# Patient Record
Sex: FEMALE | Race: WHITE | Employment: OTHER | URBAN - METROPOLITAN AREA
[De-identification: names, ages, dates, MRNs, and addresses within clinical notes are randomized per-mention and may not be internally consistent; named-entity substitution may affect disease eponyms.]

---

## 2021-08-03 RX ORDER — PANTOPRAZOLE SODIUM 40 MG/1
40 TABLET, DELAYED RELEASE ORAL DAILY
COMMUNITY

## 2021-08-03 RX ORDER — BIMATOPROST 0.3 MG/ML
1 SOLUTION/ DROPS OPHTHALMIC NIGHTLY
COMMUNITY

## 2021-08-03 RX ORDER — LEVALBUTEROL INHALATION SOLUTION 0.63 MG/3ML
1 SOLUTION RESPIRATORY (INHALATION) 2 TIMES DAILY
COMMUNITY

## 2021-08-03 RX ORDER — POTASSIUM CHLORIDE 20 MEQ/1
20 TABLET, EXTENDED RELEASE ORAL 2 TIMES DAILY
COMMUNITY

## 2021-08-03 RX ORDER — MIDODRINE HYDROCHLORIDE 5 MG/1
5 TABLET ORAL
COMMUNITY

## 2021-08-03 RX ORDER — MEGESTROL ACETATE 40 MG/1
40 TABLET ORAL DAILY
COMMUNITY

## 2021-08-03 RX ORDER — ASPIRIN 81 MG/1
81 TABLET, CHEWABLE ORAL DAILY
COMMUNITY

## 2021-08-03 RX ORDER — FUROSEMIDE 20 MG/1
20 TABLET ORAL DAILY
COMMUNITY

## 2021-08-03 RX ORDER — BRIMONIDINE TARTRATE, TIMOLOL MALEATE 2; 5 MG/ML; MG/ML
1 SOLUTION/ DROPS OPHTHALMIC NIGHTLY
COMMUNITY

## 2021-08-05 ENCOUNTER — OFFICE VISIT (OUTPATIENT)
Dept: GERIATRIC MEDICINE | Age: 86
End: 2021-08-05
Payer: MEDICARE

## 2021-08-05 DIAGNOSIS — M15.9 OSTEOARTHRITIS OF MULTIPLE JOINTS, UNSPECIFIED OSTEOARTHRITIS TYPE: ICD-10-CM

## 2021-08-05 DIAGNOSIS — R53.1 WEAKNESS: Primary | ICD-10-CM

## 2021-08-05 DIAGNOSIS — I48.20 CHRONIC ATRIAL FIBRILLATION (HCC): ICD-10-CM

## 2021-08-05 DIAGNOSIS — R10.9 ABDOMINAL PAIN, UNSPECIFIED ABDOMINAL LOCATION: ICD-10-CM

## 2021-08-05 PROCEDURE — 99304 1ST NF CARE SF/LOW MDM 25: CPT | Performed by: INTERNAL MEDICINE

## 2021-08-06 ENCOUNTER — OFFICE VISIT (OUTPATIENT)
Dept: GERIATRIC MEDICINE | Age: 86
End: 2021-08-06
Payer: MEDICARE

## 2021-08-06 VITALS
RESPIRATION RATE: 18 BRPM | SYSTOLIC BLOOD PRESSURE: 131 MMHG | TEMPERATURE: 97.9 F | OXYGEN SATURATION: 96 % | DIASTOLIC BLOOD PRESSURE: 84 MMHG | HEART RATE: 87 BPM

## 2021-08-06 DIAGNOSIS — R26.2 UNABLE TO WALK: ICD-10-CM

## 2021-08-06 DIAGNOSIS — Z93.3 STATUS POST COLOSTOMY (HCC): ICD-10-CM

## 2021-08-06 DIAGNOSIS — I10 ESSENTIAL HYPERTENSION: ICD-10-CM

## 2021-08-06 DIAGNOSIS — J44.9 CHRONIC OBSTRUCTIVE PULMONARY DISEASE, UNSPECIFIED COPD TYPE (HCC): ICD-10-CM

## 2021-08-06 DIAGNOSIS — K56.609 SBO (SMALL BOWEL OBSTRUCTION) (HCC): Primary | ICD-10-CM

## 2021-08-06 DIAGNOSIS — M19.91 PRIMARY OSTEOARTHRITIS, UNSPECIFIED SITE: ICD-10-CM

## 2021-08-06 DIAGNOSIS — M62.81 MUSCLE WEAKNESS (GENERALIZED): ICD-10-CM

## 2021-08-06 LAB
ANION GAP SERPL CALCULATED.3IONS-SCNC: 8 MEQ/L (ref 9–15)
BUN BLDV-MCNC: 10 MG/DL (ref 8–23)
CALCIUM SERPL-MCNC: 8.7 MG/DL (ref 8.5–9.9)
CHLORIDE BLD-SCNC: 105 MEQ/L (ref 95–107)
CO2: 24 MEQ/L (ref 20–31)
CREAT SERPL-MCNC: 0.4 MG/DL (ref 0.5–0.9)
GFR AFRICAN AMERICAN: >60
GFR NON-AFRICAN AMERICAN: >60
GLUCOSE BLD-MCNC: 91 MG/DL (ref 70–99)
HBA1C MFR BLD: 5.2 % (ref 4.8–5.9)
HCT VFR BLD CALC: 26.6 % (ref 37–47)
HEMOGLOBIN: 8.7 G/DL (ref 12–16)
MCH RBC QN AUTO: 30.1 PG (ref 27–31.3)
MCHC RBC AUTO-ENTMCNC: 32.7 % (ref 33–37)
MCV RBC AUTO: 92.1 FL (ref 82–100)
PDW BLD-RTO: 15.4 % (ref 11.5–14.5)
PLATELET # BLD: 330 K/UL (ref 130–400)
POTASSIUM SERPL-SCNC: 3.8 MEQ/L (ref 3.4–4.9)
RBC # BLD: 2.89 M/UL (ref 4.2–5.4)
SODIUM BLD-SCNC: 137 MEQ/L (ref 135–144)
WBC # BLD: 7.1 K/UL (ref 4.8–10.8)

## 2021-08-06 PROCEDURE — 99309 SBSQ NF CARE MODERATE MDM 30: CPT | Performed by: NURSE PRACTITIONER

## 2021-08-06 NOTE — PROGRESS NOTES
Name: Gigi Sees: [facility]  Tanya Parr  Date: 8/6/2021     Subjective:     Chief Complaint   Patient presents with    Follow-up       HPI  Kan Suazo is a 80 y.o. female being seen today for evaluation of acute rehab progress. She was admitted to [facility] from [address] [place] [address] after recovering from a small bowel resection with colostomy and extensive lysis of adhesions. She had postop complications requiring intubation and chest tubes for recurrent pneumothorax. She had new onset atrial fibrillation with a rapid ventricular response which has since resolved. Her past medical history includes but not limited to hypertension hyperlipidemia osteoarthritis vertigo borderline diabetes anxiety COPD and GERD. She is participating in PT and OT secondary to debility and deconditioning. She is sitting on the edge of the bed, therapist in her room, in no acute distress, elderly, frail. She is very hard of hearing, you have to use a writing board to communicate. She is alert and oriented to person and place. Resident's prior level of function was nonambulatory, wheelchair-bound. Therapy states she was min assist to sit on the side of the bed, max of 2 to stand, no ambulation attempted. Resident complains of weakness and fatigue, power grade of upper and lower extremities are 3 out of 5 on a scale of 5. She does have a moist nonproductive cough, lung sounds scattered rhonchi, no wheeze no rales O2 on at 2 L per nasal cannula continuously. Indwelling catheter is draining cloudy yellow urine, she complains of burning at the catheter site, will obtain UA CNS. She is currently on Lovenox for left upper extremity DVT which is resolved. She is complaining of generalized pain all over. She has orders for Tylenol but does not ask for it. Nursing staff report labs today, hemoglobin 8.7 hematocrit 28.6, BMP within acceptable range. Vital signs stable no fever. Denies complaints of chest pain chest palpitations irregular heartbeat lightheadedness dizziness nausea vomiting diarrhea constipation. PMH: Hypertension hyperlipidemia borderline diabetes anxiety osteoarthritis GERD history of TIA history of hyperactive thyroid history of vertigo atrial fibrillation COPD    PSH: History of cholecystectomy, hysterectomy, appendectomy, left shoulder surgery, bilateral knee replacement, cataract surgery bilaterally    SH: Former smoker    Allergies:  Reviewed in nursing facility records  Medications:  Reviewed and reconciled in nursing facility records    Review of Systems Pertinent positives as noted in HPI. Objective:   /84   Pulse 87   Temp 97.9 °F (36.6 °C)   Resp 18   SpO2 96%     Physical Exam Constitutional: Sitting on edge of the bed, elderly, frail, cachectic, in no acute distess  Eyes: PERRLA, conjunctiva/lids normal, sclera clear, EOMI  ENT: Oropharynx normal, no nasal discharge, mucous membranes moist  Neck: Neck supple, no JVD, no thyromegaly, no lymphadenopathy  Pulmonary/Chest:  breathing unlabored, chest excursion symmetrical, no use of accessory muscles, lung sounds scattered rhonchi, no shortness of breath at rest, dyspnea with activity, moist nonproductive cough  Cardiovascular:  S1-S2 regular, no murmur, 2+ DP x 2, no edema  Abd/GI:  abdomen soft, round, non-distended, non-tender, no masses, active bowel sounds x 4 quadrants  : No SP tenderness no CVA tenderness no hematuria only catheter with cloudy yellow urine and sediment  MS/Extremities:  PAT, ROM limited, nonambulatory, no clubbing, no cyanosis  Psych:  A/O x 2, cooperative with care, no anxiety, appropriate mood and affect  Skin:  warm and dry, color normal, no lesions, no rashes, midline right upper arm no edema no swelling no redness     Diagnosis Orders   1. SBO (small bowel obstruction) ([address]     2. Status post colostomy ([address]     3.  Essential hypertension     4. Muscle weakness (generalized)     5. Unable to walk     6. Primary osteoarthritis, unspecified site     7. Chronic obstructive pulmonary disease, unspecified COPD type ([facility]         Assessment and Plan:      1. SBO (small bowel obstruction) (HCC)/Status post colostomy (HCC)  Incision healing, colostomy draining, no signs symptom of infection. 2. Essential hypertension  Blood pressure in acceptable range, continue medications as ordered. 3. Muscle weakness (generalized)/Unable to walk  Continue PT and OT as ordered. DC Lovenox    4. Primary osteoarthritis, unspecified site  Tylenol 650 mg p.o. every morning and every bedtime-do not exceed 4000 mg in a 24.    5. Chronic obstructive pulmonary disease, unspecified COPD type ([facility]  Continue oxygen as ordered, respiratory therapy consult. Continue nebulizer as ordered. Reviewed labs: Yes   8/6/2021 CBC WBC 7.1 hemoglobin 8.7 hematocrit 28.6 platelet 602  0/3//6023 BMP sodium 137 potassium 3.8 chloride 105 CO2 24 BUN 10 creatinine 0.40 glucose 91      Time based visit:  time spent 30 minutes>50% spent with counseling and coordination of care. Reviewed with the patient: current clinicalstatus, medications, activities and diet. Had to use drawing board to communicate. Discussed pain, bowel movement, urine pain, weakness, prior level of function which is nonambulatory. Discussed that she will be receiving labs weekly for 3 weeks, she verbalized understanding. She realizes we will be obtaining a urine sample to check for bladder infection. Reviewed therapy plan and progress with therapists. Reviewed medical record, labs, medications, etc.    I have reviewed the patient's medical history in detail and updated the computerized patient record.     This note was partially generated using Dragon voice recognition system, and there may be some incorrect words, spellings, punctuation that were not noticed in checking the note before saving.     José Luis Tijerina, APRN-CNP

## 2021-08-07 LAB
BACTERIA: ABNORMAL /HPF
BILIRUBIN URINE: NEGATIVE
BLOOD, URINE: ABNORMAL
CLARITY: ABNORMAL
COLOR: ABNORMAL
CRYSTALS, UA: ABNORMAL /HPF
EPITHELIAL CELLS, UA: ABNORMAL /HPF (ref 0–5)
GLUCOSE URINE: NEGATIVE MG/DL
HYALINE CASTS: ABNORMAL /HPF (ref 0–5)
KETONES, URINE: NEGATIVE MG/DL
LEUKOCYTE ESTERASE, URINE: ABNORMAL
NITRITE, URINE: POSITIVE
PH UA: 6 (ref 5–9)
PROTEIN UA: 100 MG/DL
RBC UA: ABNORMAL /HPF (ref 0–2)
SPECIFIC GRAVITY UA: 1.02 (ref 1–1.03)
UROBILINOGEN, URINE: 1 E.U./DL
WBC UA: >100 /HPF (ref 0–5)

## 2021-08-09 LAB
ORGANISM: ABNORMAL
URINE CULTURE, ROUTINE: ABNORMAL

## 2021-08-10 ENCOUNTER — OFFICE VISIT (OUTPATIENT)
Dept: GERIATRIC MEDICINE | Age: 86
End: 2021-08-10
Payer: MEDICARE

## 2021-08-10 DIAGNOSIS — N30.00 ACUTE CYSTITIS WITHOUT HEMATURIA: ICD-10-CM

## 2021-08-10 DIAGNOSIS — Z93.3 STATUS POST COLOSTOMY (HCC): ICD-10-CM

## 2021-08-10 DIAGNOSIS — K56.609 SBO (SMALL BOWEL OBSTRUCTION) (HCC): Primary | ICD-10-CM

## 2021-08-10 DIAGNOSIS — I10 ESSENTIAL HYPERTENSION: ICD-10-CM

## 2021-08-10 LAB
ALBUMIN SERPL-MCNC: 2.8 G/DL (ref 3.5–4.6)
ANION GAP SERPL CALCULATED.3IONS-SCNC: 15 MEQ/L (ref 9–15)
BUN BLDV-MCNC: 9 MG/DL (ref 8–23)
CALCIUM SERPL-MCNC: 9.7 MG/DL (ref 8.5–9.9)
CHLORIDE BLD-SCNC: 100 MEQ/L (ref 95–107)
CO2: 20 MEQ/L (ref 20–31)
CREAT SERPL-MCNC: 0.37 MG/DL (ref 0.5–0.9)
GFR AFRICAN AMERICAN: >60
GFR NON-AFRICAN AMERICAN: >60
GLUCOSE BLD-MCNC: 95 MG/DL (ref 70–99)
HCT VFR BLD CALC: 31.4 % (ref 37–47)
HEMOGLOBIN: 10.2 G/DL (ref 12–16)
MCH RBC QN AUTO: 30.1 PG (ref 27–31.3)
MCHC RBC AUTO-ENTMCNC: 32.5 % (ref 33–37)
MCV RBC AUTO: 92.4 FL (ref 82–100)
PDW BLD-RTO: 15.3 % (ref 11.5–14.5)
PLATELET # BLD: 328 K/UL (ref 130–400)
POTASSIUM SERPL-SCNC: 3.8 MEQ/L (ref 3.4–4.9)
PREALBUMIN: 13.4 MG/DL (ref 20–40)
RBC # BLD: 3.4 M/UL (ref 4.2–5.4)
SODIUM BLD-SCNC: 135 MEQ/L (ref 135–144)
TOTAL PROTEIN: 7 G/DL (ref 6.3–8)
WBC # BLD: 9.7 K/UL (ref 4.8–10.8)

## 2021-08-10 PROCEDURE — 99309 SBSQ NF CARE MODERATE MDM 30: CPT | Performed by: NURSE PRACTITIONER

## 2021-08-12 VITALS
TEMPERATURE: 98.3 F | DIASTOLIC BLOOD PRESSURE: 80 MMHG | RESPIRATION RATE: 18 BRPM | OXYGEN SATURATION: 98 % | HEART RATE: 82 BPM | SYSTOLIC BLOOD PRESSURE: 121 MMHG

## 2021-08-12 PROBLEM — N30.00 ACUTE CYSTITIS WITHOUT HEMATURIA: Status: ACTIVE | Noted: 2021-08-12

## 2021-08-12 NOTE — PROGRESS NOTES
Name: Jorge Alberto Ruiz: [facility]   Tanya mcwilliams  Date: 8/10/2021     Subjective:     Chief Complaint   Patient presents with    Follow-up       HPI  Sean García is a 80 y.o. female being seen today for evaluation of abnormal urine culture, hypertension, colostomy. Resident is lying in bed, having her lunch, her son Elle Garcia is at the bedside, she is in no acute distress. She is very hard of hearing therefore communication occurs via whiteboard. She is alert and oriented x2, cooperative with all care. Appetite is fair urine output is good. She has an indwelling Marshall catheter that was changed due to leaking. Urine culture was greater than 100,000 CFU per mL of Pseudomonas, she is currently on Levaquin which is resistant. She had been complaining of burning around the catheter insertion site but states today \"it is much better. \"  She is participating in physical therapy, making progress although baseline level of function is nonambulatory and wheelchair-bound. She complains of generalized pain and discomfort that is exacerbated with therapy and relieved with rest and Tylenol. Resident states pain is moderate, is unable to quantify on a 0-to-10 scale. Nursing staff report labs today that are within acceptable range. Vital signs are stable no fever. Colostomy is draining brown stool. Left buttock stage II pressure ulcer, pink wound bed, no signs and symptoms of infection. We will continue to monitor. She denies complaints of chest pain chest palpitations irregular heartbeat lightheadedness dizziness nausea vomiting diarrhea constipation abdominal pain abdominal bloating.       PMH: Hypertension hyperlipidemia borderline diabetes anxiety osteoarthritis GERD history of TIA history of hyperactive thyroid history of vertigo atrial fibrillation COPD    Allergies:  Reviewed in nursing facility records  Medications:  Reviewed and reconciled in nursing facility records    Review of Systems Pertinent positives as noted in HPI. Objective:   /80   Pulse 82   Temp 98.3 °F (36.8 °C)   Resp 18   SpO2 98%     Physical Exam Constitutional:  up in wheelchair, elderly, frail, in no acute distess  Pulmonary/Chest:  breathing unlabored, chest excursion symmetrical, no use of accessory muscles, lung sounds clear, no shortness of breath at rest, dyspnea with activity  Cardiovascular:  S1-S2 regular, no murmur, 2+ DP x 2, no edema  Abd/GI:  abdomen soft, round, non-distended, non-tender, no masses, active bowel sounds x 4 quadrants  : no SP tenderness,no CVA tenderness, no hematuria, indwelling Marshall catheter  MS/Extremities:  PAT, ROM limited, nonambulatory, no clubbing, no cyanosis  Psych:  A/O x 2, cooperative with care, no anxiety, appropriate mood and affect  Skin:  warm and dry, color normal, no lesions, no rashes, small left buttock wound     Diagnosis Orders   1. SBO (small bowel obstruction) ([facility]     2. Status post colostomy ([facility]     3. Essential hypertension     4. Acute cystitis without hematuria         Assessment and Plan:      1. SBO (small bowel obstruction) (HCC)/Status post colostomy [facility])  Colostomy draining well, follow-up with surgeon as ordered. 2. Essential hypertension  Blood pressure within acceptable range, continue medications as ordered. Continue midodrine Lasix and potassium    3.  Acute cystitis without hematuria  DC levofloxacin  Start imipenem cilastatin 500 mg IV every 6 hours x5 days  Acidophilus 1 tab 3 times daily with meals x10 days      Reviewed labs:  Yes    CBC  Recent Labs     08/10/21  0745   WBC 9.7   RBC 3.40*   HGB 10.2*   HCT 31.4*   MCV 92.4   MCH 30.1   MCHC 32.5*   RDW 15.3*         BMP  Recent Labs     08/10/21  0745      K 3.8      CO2 20   BUN 9   CREATININE 0.37*   GLUCOSE 95   CALCIUM 9.7      HgBA1c:    Lab Results   Component Value Date    LABA1C 5.2 08/06/2021       I have reviewed the patient's medical history in detail and updated the computerized patient record. This note was partially generated using Dragon voice recognition system, and there may be some incorrect words, spellings, punctuation that were not noticed in checking the note before saving.     Ashley Henao, RICH-CNP

## 2021-08-13 ENCOUNTER — OFFICE VISIT (OUTPATIENT)
Dept: GERIATRIC MEDICINE | Age: 86
End: 2021-08-13
Payer: MEDICARE

## 2021-08-13 VITALS
RESPIRATION RATE: 18 BRPM | HEART RATE: 62 BPM | SYSTOLIC BLOOD PRESSURE: 120 MMHG | TEMPERATURE: 98 F | DIASTOLIC BLOOD PRESSURE: 71 MMHG | OXYGEN SATURATION: 96 % | WEIGHT: 119.9 LBS

## 2021-08-13 DIAGNOSIS — L89.322 STAGE II PRESSURE ULCER OF LEFT BUTTOCK (HCC): ICD-10-CM

## 2021-08-13 DIAGNOSIS — Z93.3 STATUS POST COLOSTOMY (HCC): ICD-10-CM

## 2021-08-13 DIAGNOSIS — N30.00 ACUTE CYSTITIS WITHOUT HEMATURIA: ICD-10-CM

## 2021-08-13 DIAGNOSIS — R52 GENERALIZED PAIN: ICD-10-CM

## 2021-08-13 DIAGNOSIS — R63.0 LOSS OF APPETITE: ICD-10-CM

## 2021-08-13 DIAGNOSIS — I10 ESSENTIAL HYPERTENSION: ICD-10-CM

## 2021-08-13 DIAGNOSIS — K56.609 SBO (SMALL BOWEL OBSTRUCTION) (HCC): Primary | ICD-10-CM

## 2021-08-13 LAB
ANION GAP SERPL CALCULATED.3IONS-SCNC: 9 MEQ/L (ref 9–15)
BUN BLDV-MCNC: 13 MG/DL (ref 8–23)
CALCIUM SERPL-MCNC: 8.5 MG/DL (ref 8.5–9.9)
CHLORIDE BLD-SCNC: 103 MEQ/L (ref 95–107)
CO2: 24 MEQ/L (ref 20–31)
CREAT SERPL-MCNC: 0.38 MG/DL (ref 0.5–0.9)
GFR AFRICAN AMERICAN: >60
GFR NON-AFRICAN AMERICAN: >60
GLUCOSE BLD-MCNC: 91 MG/DL (ref 70–99)
HCT VFR BLD CALC: 27.2 % (ref 37–47)
HEMOGLOBIN: 8.9 G/DL (ref 12–16)
MCH RBC QN AUTO: 29.8 PG (ref 27–31.3)
MCHC RBC AUTO-ENTMCNC: 32.7 % (ref 33–37)
MCV RBC AUTO: 91.2 FL (ref 82–100)
PDW BLD-RTO: 15.2 % (ref 11.5–14.5)
PLATELET # BLD: 262 K/UL (ref 130–400)
POTASSIUM SERPL-SCNC: 3.4 MEQ/L (ref 3.4–4.9)
RBC # BLD: 2.99 M/UL (ref 4.2–5.4)
SODIUM BLD-SCNC: 136 MEQ/L (ref 135–144)
WBC # BLD: 8.8 K/UL (ref 4.8–10.8)

## 2021-08-13 PROCEDURE — 99309 SBSQ NF CARE MODERATE MDM 30: CPT | Performed by: NURSE PRACTITIONER

## 2021-08-15 PROBLEM — R63.0 LOSS OF APPETITE: Status: ACTIVE | Noted: 2021-08-15

## 2021-08-15 ASSESSMENT — ENCOUNTER SYMPTOMS
WHEEZING: 0
COLOR CHANGE: 0
GASTROINTESTINAL NEGATIVE: 1
SHORTNESS OF BREATH: 0
COUGH: 1

## 2021-08-16 NOTE — PROGRESS NOTES
Name: Jose Osgood: [facility]  Tanya Power  Date: 8/13/2021     Subjective:     Chief Complaint   Patient presents with    Follow-up       HPI  Panda Ballesteros is a 80 y.o. female being seen today for evaluation of colostomy output, hypertension, UTI, pain, stage II pressure ulcer of left buttock. Resident is lying in bed, elderly, frail, cachectic. She is status post small bowel obstruction with colectomy and colostomy, colostomy draining brown stool. She has generalized pain discomfort, states pain level is 3 out of 10 on a scale of 10. She is receiving routine Tylenol because she has dementia and will not remember to ask for pain medication. Her son Jose Cochran agrees that is a good idea to make her pain medicine routine. Resident's appetite is poor although she states I eat what I can. She is on a special diet but did not complain of the diet. Vital signs are stable no fever. Blood pressure within acceptable range. She is being treated with IV imipenem for UTI, tolerating medicine without signs and symptoms of allergy. Wound to left buttock is being managed by wound care team and nurse practitioner, slowly healing. Resident denies complaints of chest pain chest palpitation irregular heartbeat lightheadedness dizziness nausea vomiting diarrhea constipation. Consultant pharmacist recommended that we DC Megace due to this medication has been identified as potentially inappropriate for use in geriatric patients per beers criteria. Allergies:  Reviewed in nursing facility records  Medications:  Reviewed and reconciled in nursing facility records    Review of Systems   Constitutional: Positive for fatigue. Negative for activity change, appetite change and fever. HENT: Negative. Respiratory: Positive for cough. Negative for shortness of breath and wheezing. Cardiovascular: Negative for chest pain, palpitations and leg swelling. Gastrointestinal: Negative. Genitourinary: Negative. Musculoskeletal: Positive for arthralgias and gait problem. Skin: Positive for wound. Negative for color change. Neurological: Positive for weakness. Negative for dizziness and light-headedness. Psychiatric/Behavioral: Negative for agitation, behavioral problems and confusion. The patient is not nervous/anxious. .smr    Objective:   /71   Pulse 62   Temp 98 °F (36.7 °C)   Resp 18   Wt 119 lb 14.4 oz (54.4 kg)   SpO2 96%     Physical Exam Constitutional: Lying in bed, elderly, frail, in no acute distess  Pulmonary/Chest:  breathing unlabored, chest excursion symmetrical, no use of accessory muscles, lung sounds clear and diminished in lower lobes, no shortness of breath at rest, dyspnea with activity  Cardiovascular:  S1-S2 regular, no murmur, 2+ DP x 2, no edema  Abd/GI:  abdomen soft, round, non-distended, non-tender, no masses, active bowel sounds x 4 quadrants, colostomy draining brown stool  : no SP tenderness,no CVA tenderness, no hematuria, indwelling Marshall catheter  MS/Extremities:  PAT, ROM limited, nonambulatory, no clubbing, no cyanosis  Psych:  A/O x 3, cooperative with care, no anxiety, appropriate mood and affect  Skin:  warm and dry, color normal, no lesions, no rashes, dehisced wound of surgical incision     Diagnosis Orders   1. SBO (small bowel obstruction) ([address]     2. Status post colostomy ([address]     3. Essential hypertension     4. Acute cystitis without hematuria     5. Generalized pain     6. Stage II pressure ulcer of left buttock (HCC)     7. Loss of appetite           Assessment and Plan:      1. SBO (small bowel obstruction) ([facility])/Status post colostomy ([facility])  Colostomy draining brown stool, dehisced wound of surgical incision being packed. Surgeon is following wound care. We will continue to monitor. 2. Essential hypertension  Pressure within acceptable range, continue meds as ordered.     3. Acute cystitis without hematuria  Tolerating imipenem well without signs and symptoms of allergy. Denies dysuria or pain around catheter site. Continue meds as ordered. 4. Generalized pain  Continue routine Tylenol as ordered. 5. Stage II pressure ulcer of left buttock ([address]  Wound care team are following the wound on the left buttock, slowly healing. Continue current treatment orders. 6. Loss of appetite  DC megestrol. Remeron 7.5 mg p.o. nightly for appetite stimulant. Reviewed labs:  Yes    CBC  Lab Results   Component Value Date    WBC 8.8 08/13/2021    RBC 2.99 08/13/2021    HGB 8.9 08/13/2021    HCT 27.2 08/13/2021    MCV 91.2 08/13/2021    MCH 29.8 08/13/2021    MCHC 32.7 08/13/2021    RDW 15.2 08/13/2021     08/13/2021      BMP  Lab Results   Component Value Date     08/13/2021    K 3.4 08/13/2021     08/13/2021    CO2 24 08/13/2021    BUN 13 08/13/2021    CREATININE 0.38 08/13/2021    GLUCOSE 91 08/13/2021    CALCIUM 8.5 08/13/2021      HgBA1c:    Lab Results   Component Value Date    LABA1C 5.2 08/06/2021       I have reviewed the patient's medical history in detail and updated the computerized patient record. This note was partially generated using Dragon voice recognition system, and there may be some incorrect words, spellings, punctuation that were not noticed in checking the note before saving.     Ashley Henao, APRN-CNP

## 2021-08-17 ENCOUNTER — OFFICE VISIT (OUTPATIENT)
Dept: GERIATRIC MEDICINE | Age: 86
End: 2021-08-17
Payer: MEDICARE

## 2021-08-17 DIAGNOSIS — K56.609 SBO (SMALL BOWEL OBSTRUCTION) (HCC): Primary | ICD-10-CM

## 2021-08-17 DIAGNOSIS — N30.00 ACUTE CYSTITIS WITHOUT HEMATURIA: ICD-10-CM

## 2021-08-17 DIAGNOSIS — M62.81 MUSCLE WEAKNESS (GENERALIZED): ICD-10-CM

## 2021-08-17 DIAGNOSIS — R52 GENERALIZED PAIN: ICD-10-CM

## 2021-08-17 DIAGNOSIS — Z93.3 STATUS POST COLOSTOMY (HCC): ICD-10-CM

## 2021-08-17 DIAGNOSIS — I10 ESSENTIAL HYPERTENSION: ICD-10-CM

## 2021-08-17 PROCEDURE — 99309 SBSQ NF CARE MODERATE MDM 30: CPT | Performed by: NURSE PRACTITIONER

## 2021-08-19 VITALS
DIASTOLIC BLOOD PRESSURE: 69 MMHG | SYSTOLIC BLOOD PRESSURE: 120 MMHG | HEART RATE: 82 BPM | RESPIRATION RATE: 18 BRPM | TEMPERATURE: 98.2 F | OXYGEN SATURATION: 98 %

## 2021-08-19 ASSESSMENT — ENCOUNTER SYMPTOMS
CONSTIPATION: 0
SHORTNESS OF BREATH: 0
VOMITING: 0
ABDOMINAL PAIN: 0
COLOR CHANGE: 0
BLOOD IN STOOL: 0
NAUSEA: 0
DIARRHEA: 0
WHEEZING: 0
COUGH: 1

## 2021-08-19 NOTE — PROGRESS NOTES
Positive for hearing loss. Negative for congestion. Respiratory: Positive for cough. Negative for shortness of breath and wheezing. Cardiovascular: Negative for chest pain, palpitations and leg swelling. Gastrointestinal: Negative for abdominal pain, blood in stool, constipation, diarrhea, nausea and vomiting. Genitourinary: Negative for hematuria. Musculoskeletal: Positive for arthralgias and gait problem. Skin: Positive for wound. Negative for color change. Neurological: Positive for weakness. Negative for dizziness and light-headedness. Psychiatric/Behavioral: Negative for behavioral problems and confusion. The patient is not nervous/anxious. Objective:   /69   Pulse 82   Temp 98.2 °F (36.8 °C)   Resp 18   SpO2 98%     Physical Exam Constitutional: Lying in bed, elderly, frail, in no acute distess  Pulmonary/Chest:  breathing unlabored, chest excursion symmetrical, no use of accessory muscles, lung sounds rhonchi no wheezing, no shortness of breath at rest, dyspnea with activity  Cardiovascular:  S1-S2 regular, murmur, 2+ DP x 2, edema  Abd/GI:  abdomen soft, round, non-distended, non-tender, no masses, active bowel sounds x 4 quadrants colostomy with brown stool  : no SP tenderness,no CVA tenderness, no hematuria, Marshall catheter with clear yellow urine  MS/Extremities:  PAT, ROM limited, nonambulatory, no clubbing, no cyanosis  Psych:  A/O x 3, cooperative with care, no anxiety, appropriate mood and affect  Skin:  warm and dry, color normal, no lesions, no rashes, left buttock wound     Diagnosis Orders   1. SBO (small bowel obstruction) ([address]     2. Status post colostomy ([address]     3. Essential hypertension     4. Acute cystitis without hematuria     5. Generalized pain     6. Muscle weakness (generalized)         Assessment and Plan:      1.  SBO (small bowel obstruction) (HCC)/Status post colostomy ([address]  Abdominal incision dehisced wound is without signs and symptoms of infection. Colostomy draining brown stool. 2. Essential hypertension  Continue midodrine and aspirin    3. Acute cystitis without hematuria  Imipenem is complete, still on acidophilus. Continue acidophilus as ordered. 4. Generalized pain  Continue routine Tylenol as ordered. 5. Muscle weakness (generalized)  Continue PT and OT as ordered. Reviewed labs:  Yes    CBC  Lab Results   Component Value Date    WBC 8.8 08/13/2021    RBC 2.99 08/13/2021    HGB 8.9 08/13/2021    HCT 27.2 08/13/2021    MCV 91.2 08/13/2021    MCH 29.8 08/13/2021    MCHC 32.7 08/13/2021    RDW 15.2 08/13/2021     08/13/2021      BMP  Lab Results   Component Value Date     08/13/2021    K 3.4 08/13/2021     08/13/2021    CO2 24 08/13/2021    BUN 13 08/13/2021    CREATININE 0.38 08/13/2021    GLUCOSE 91 08/13/2021    CALCIUM 8.5 08/13/2021      HgBA1c:    Lab Results   Component Value Date    LABA1C 5.2 08/06/2021       I have reviewed the patient's medical history in detail and updated the computerized patient record. This note was partially generated using Dragon voice recognition system, and there may be some incorrect words, spellings, punctuation that were not noticed in checking the note before saving.     RICH Briones-CNP

## 2021-08-20 LAB
ANION GAP SERPL CALCULATED.3IONS-SCNC: 13 MEQ/L (ref 9–15)
BUN BLDV-MCNC: 18 MG/DL (ref 8–23)
CALCIUM SERPL-MCNC: 9.7 MG/DL (ref 8.5–9.9)
CHLORIDE BLD-SCNC: 103 MEQ/L (ref 95–107)
CO2: 22 MEQ/L (ref 20–31)
CREAT SERPL-MCNC: 0.52 MG/DL (ref 0.5–0.9)
GFR AFRICAN AMERICAN: >60
GFR NON-AFRICAN AMERICAN: >60
GLUCOSE BLD-MCNC: 158 MG/DL (ref 70–99)
HCT VFR BLD CALC: 30.7 % (ref 37–47)
HEMOGLOBIN: 10 G/DL (ref 12–16)
MCH RBC QN AUTO: 29.4 PG (ref 27–31.3)
MCHC RBC AUTO-ENTMCNC: 32.6 % (ref 33–37)
MCV RBC AUTO: 90 FL (ref 82–100)
PDW BLD-RTO: 15.1 % (ref 11.5–14.5)
PLATELET # BLD: 316 K/UL (ref 130–400)
POTASSIUM SERPL-SCNC: 4.3 MEQ/L (ref 3.4–4.9)
RBC # BLD: 3.41 M/UL (ref 4.2–5.4)
SODIUM BLD-SCNC: 138 MEQ/L (ref 135–144)
WBC # BLD: 10.6 K/UL (ref 4.8–10.8)

## 2021-08-27 LAB
ANION GAP SERPL CALCULATED.3IONS-SCNC: 11 MEQ/L (ref 9–15)
BUN BLDV-MCNC: 12 MG/DL (ref 8–23)
CALCIUM SERPL-MCNC: 9.1 MG/DL (ref 8.5–9.9)
CHLORIDE BLD-SCNC: 103 MEQ/L (ref 95–107)
CO2: 23 MEQ/L (ref 20–31)
CREAT SERPL-MCNC: 0.42 MG/DL (ref 0.5–0.9)
GFR AFRICAN AMERICAN: >60
GFR NON-AFRICAN AMERICAN: >60
GLUCOSE BLD-MCNC: 87 MG/DL (ref 70–99)
HCT VFR BLD CALC: 28 % (ref 37–47)
HEMOGLOBIN: 9 G/DL (ref 12–16)
MCH RBC QN AUTO: 28.6 PG (ref 27–31.3)
MCHC RBC AUTO-ENTMCNC: 32.1 % (ref 33–37)
MCV RBC AUTO: 88.8 FL (ref 82–100)
PDW BLD-RTO: 15.1 % (ref 11.5–14.5)
PLATELET # BLD: 316 K/UL (ref 130–400)
POTASSIUM SERPL-SCNC: 3.6 MEQ/L (ref 3.4–4.9)
RBC # BLD: 3.15 M/UL (ref 4.2–5.4)
SODIUM BLD-SCNC: 137 MEQ/L (ref 135–144)
WBC # BLD: 7 K/UL (ref 4.8–10.8)

## 2021-09-03 ENCOUNTER — OFFICE VISIT (OUTPATIENT)
Dept: GERIATRIC MEDICINE | Age: 86
End: 2021-09-03
Payer: MEDICARE

## 2021-09-03 VITALS
RESPIRATION RATE: 18 BRPM | DIASTOLIC BLOOD PRESSURE: 85 MMHG | OXYGEN SATURATION: 96 % | WEIGHT: 123.9 LBS | HEART RATE: 90 BPM | TEMPERATURE: 98.3 F | SYSTOLIC BLOOD PRESSURE: 113 MMHG

## 2021-09-03 DIAGNOSIS — Z93.3 STATUS POST COLOSTOMY (HCC): ICD-10-CM

## 2021-09-03 DIAGNOSIS — R52 GENERALIZED PAIN: ICD-10-CM

## 2021-09-03 DIAGNOSIS — K56.609 SBO (SMALL BOWEL OBSTRUCTION) (HCC): Primary | ICD-10-CM

## 2021-09-03 DIAGNOSIS — I48.91 ATRIAL FIBRILLATION, UNSPECIFIED TYPE (HCC): ICD-10-CM

## 2021-09-03 DIAGNOSIS — I10 ESSENTIAL HYPERTENSION: ICD-10-CM

## 2021-09-03 DIAGNOSIS — M62.81 MUSCLE WEAKNESS (GENERALIZED): ICD-10-CM

## 2021-09-03 DIAGNOSIS — J44.9 CHRONIC OBSTRUCTIVE PULMONARY DISEASE, UNSPECIFIED COPD TYPE (HCC): ICD-10-CM

## 2021-09-03 PROCEDURE — 99309 SBSQ NF CARE MODERATE MDM 30: CPT | Performed by: NURSE PRACTITIONER

## 2021-09-03 NOTE — PROGRESS NOTES
Name: Gigi Sees: [facility]   Tanya mcwilliams  Date: 9/3/2021     Subjective:     Chief Complaint   Patient presents with    Follow-up       HPI  Kan Suazo is a 80 y.o. female being seen today for evaluation of acute rehab progress, open abdominal wound with colostomy, COPD, pain management, and atrial fibrillation. Resident is alert and oriented x3, elderly, frail, in no acute distress. Occupational therapist is at bedside, resident is performing strengthening exercises with dumbbells. She refuses to get out of bed today stating \"I do not feel very well and I am just too tired to get out of bed. \"  Therapist explains that she needs to be more mobile and get out of bed so that she can have the strength to discharge home. Resident states \"I will get out of bed sometime tomorrow, but not today. \"  Resident is definitely hard of hearing, communicates with communication board. She complains of small amount of generalized discomfort, is unable to quantify it on a scale of 1-10. Resident states pain is exacerbated with activity and subsides with rest and pain medication. She continues on routine Tylenol with good effect. Resident had surgical procedure last week to open tunneled areas within the wound bed to facilitate wound healing. Resident has a wound VAC in place at 125 mmHg to her abdominal incision, small amount of serosanguineous drainage. Abdomen is soft round positive bowel sounds x4 quadrants, colostomy is draining soft brown stool. He is wearing oxygen at 2 L per nasal cannula, lung sounds reveal soft rhonchi and expiratory wheezing. Resident denies complaints of chest pain chest palpitations irregular heartbeat lightheadedness dizziness nausea vomiting diarrhea constipation. Has a Marshall catheter draining clear yellow urine, no hematuria. Nursing staff report labs, mild anemia, BMP normal.  Vital signs stable, no fever.   We will continue to monitor. PMH: Hypertension hyperlipidemia borderline diabetes anxiety osteoarthritis GERD history of TIA history of hyperactive thyroid history of vertigo atrial fibrillation COPD      Allergies:  Reviewed in nursing facility records  Medications:  Reviewed and reconciled in nursing facility records    Review of Systems   Constitutional: Positive for fatigue. Negative for activity change, appetite change and fever. HENT: Negative. Respiratory: Positive for cough and wheezing. Negative for chest tightness and shortness of breath. Cardiovascular: Negative for chest pain, palpitations and leg swelling. Gastrointestinal: Negative for abdominal distention, abdominal pain, constipation, diarrhea, nausea and vomiting. Genitourinary: Negative for flank pain, hematuria and pelvic pain. Musculoskeletal: Positive for arthralgias and gait problem. Negative for joint swelling. Skin: Negative for color change and wound. Neurological: Positive for weakness. Negative for dizziness and light-headedness. Psychiatric/Behavioral: Negative for behavioral problems and confusion. The patient is not nervous/anxious.         Objective:   /85   Pulse 90   Temp 98.3 °F (36.8 °C)   Resp 18   Wt 123 lb 14.4 oz (56.2 kg)   SpO2 96%     Physical Exam Constitutional: Lying in bed, elderly, frail, in no acute distess  Pulmonary/Chest:  breathing unlabored, chest excursion symmetrical, no use of accessory muscles, lung sounds rhonchi and expiratory wheezing, no shortness of breath at rest, mild dyspnea with activity  Cardiovascular:  S1-S2 regular, 2+ DP x 2, edema  Abd/GI:  abdomen soft, round, non-distended, non-tender, no masses, active bowel sounds x 4 quadrants, colostomy with soft brown stool, stoma pink, abdominal wound with wound VAC in place  : no SP tenderness,no CVA tenderness, no hematuria, Marshall catheter draining clear yellow urine  MS/Extremities:  PAT, ROM limited, nonambulatory, no clubbing, no cyanosis  Psych:  A/O x 3, cooperative with care, no anxiety, appropriate mood and affect  Skin:  warm and dry, color pale, no lesions, no rashes,     Diagnosis Orders   1. SBO (small bowel obstruction) ([facility]     2. Status post colostomy ([facility]     3. Essential hypertension     4. Generalized pain     5. Muscle weakness (generalized)     6. Chronic obstructive pulmonary disease, unspecified COPD type ([facility]     7. Atrial fibrillation, unspecified type ([facility]         Assessment and Plan:      1. SBO (small bowel obstruction) (HCC)/Status post colostomy (HCC)  Open wound to midline abdominal incision, wound VAC at 125 mmHg, continue as ordered. 2. Essential hypertension  Blood pressure within acceptable range on current medication, continue midodrine as ordered. 3. Generalized pain  Continue routine Tylenol as ordered. 4. Muscle weakness (generalized)  Continue PT and OT as ordered. Focusing on strengthening endurance ADLs. 5. Chronic obstructive pulmonary disease, unspecified COPD type ([facility]  Symptoms are at baseline, O2 on at 2 L per nasal cannula, SPO2 within acceptable range. Continue albuterol as ordered. 6. [facility], unspecified [facility]  Heart rate is controlled on Cardizem, continue as ordered. Reviewed labs:  Yes    CBC  Lab Results   Component Value Date    WBC 7.0 08/27/2021    RBC 3.15 08/27/2021    HGB 9.0 08/27/2021    HCT 28.0 08/27/2021    MCV 88.8 08/27/2021    MCH 28.6 08/27/2021    MCHC 32.1 08/27/2021    RDW 15.1 08/27/2021     08/27/2021      BMP  Lab Results   Component Value Date     08/27/2021    K 3.6 08/27/2021     08/27/2021    CO2 23 08/27/2021    BUN 12 08/27/2021    CREATININE 0.42 08/27/2021    GLUCOSE 87 08/27/2021    CALCIUM 9.1 08/27/2021      HgBA1c:    Lab Results   Component Value Date    LABA1C 5.2 08/06/2021         Time based visit:  time spent 25 minutes>70% spent with counseling and coordination of care.   Focused exclusively on this resident to review: current clinicalstatus, medications, activities and diet. Side effects, adverse effects of the medication prescribed, as well as treatment plan and result expectations. Discussed diagnostic results, other suggested diagnostic testing, prognosis, risk and benefits of treatment options, importance of compliance with treatment options, especially therapy and diet. Reviewed therapy plan and progress with therapists. Reviewed medical record, labs, medications, etc.    I have reviewed the patient's medical history in detail and updated the computerized patient record. This note was partially generated using Dragon voice recognition system, and there may be some incorrect words, spellings, punctuation that were not noticed in checking the note before saving.     José Luis Tijerina, APRN-CNP

## 2021-09-05 ASSESSMENT — ENCOUNTER SYMPTOMS
NAUSEA: 0
WHEEZING: 1
CHEST TIGHTNESS: 0
COLOR CHANGE: 0
ABDOMINAL DISTENTION: 0
SHORTNESS OF BREATH: 0
VOMITING: 0
ABDOMINAL PAIN: 0
DIARRHEA: 0
CONSTIPATION: 0
COUGH: 1

## 2021-09-07 ENCOUNTER — OFFICE VISIT (OUTPATIENT)
Dept: GERIATRIC MEDICINE | Age: 86
End: 2021-09-07
Payer: MEDICARE

## 2021-09-07 DIAGNOSIS — R52 GENERALIZED PAIN: ICD-10-CM

## 2021-09-07 DIAGNOSIS — Z93.3 STATUS POST COLOSTOMY (HCC): ICD-10-CM

## 2021-09-07 DIAGNOSIS — K56.609 SBO (SMALL BOWEL OBSTRUCTION) (HCC): Primary | ICD-10-CM

## 2021-09-07 DIAGNOSIS — M62.81 MUSCLE WEAKNESS (GENERALIZED): ICD-10-CM

## 2021-09-07 DIAGNOSIS — J44.9 CHRONIC OBSTRUCTIVE PULMONARY DISEASE, UNSPECIFIED COPD TYPE (HCC): ICD-10-CM

## 2021-09-07 LAB
ALBUMIN SERPL-MCNC: 2.6 G/DL (ref 3.5–4.6)
ALP BLD-CCNC: 101 U/L (ref 40–130)
ALT SERPL-CCNC: 9 U/L (ref 0–33)
ANION GAP SERPL CALCULATED.3IONS-SCNC: 12 MEQ/L (ref 9–15)
AST SERPL-CCNC: 14 U/L (ref 0–35)
BILIRUB SERPL-MCNC: 0.5 MG/DL (ref 0.2–0.7)
BUN BLDV-MCNC: 12 MG/DL (ref 8–23)
CALCIUM SERPL-MCNC: 8.8 MG/DL (ref 8.5–9.9)
CHLORIDE BLD-SCNC: 104 MEQ/L (ref 95–107)
CO2: 22 MEQ/L (ref 20–31)
CREAT SERPL-MCNC: 0.48 MG/DL (ref 0.5–0.9)
GFR AFRICAN AMERICAN: >60
GFR NON-AFRICAN AMERICAN: >60
GLOBULIN: 3.3 G/DL (ref 2.3–3.5)
GLUCOSE BLD-MCNC: 84 MG/DL (ref 70–99)
HCT VFR BLD CALC: 29.3 % (ref 37–47)
HEMOGLOBIN: 9.8 G/DL (ref 12–16)
MCH RBC QN AUTO: 29 PG (ref 27–31.3)
MCHC RBC AUTO-ENTMCNC: 33.3 % (ref 33–37)
MCV RBC AUTO: 87.1 FL (ref 82–100)
PDW BLD-RTO: 15.4 % (ref 11.5–14.5)
PLATELET # BLD: 303 K/UL (ref 130–400)
POTASSIUM SERPL-SCNC: 4.4 MEQ/L (ref 3.4–4.9)
RBC # BLD: 3.37 M/UL (ref 4.2–5.4)
SODIUM BLD-SCNC: 138 MEQ/L (ref 135–144)
TOTAL PROTEIN: 5.9 G/DL (ref 6.3–8)
WBC # BLD: 8.3 K/UL (ref 4.8–10.8)

## 2021-09-07 PROCEDURE — 99309 SBSQ NF CARE MODERATE MDM 30: CPT | Performed by: NURSE PRACTITIONER

## 2021-09-09 VITALS
RESPIRATION RATE: 18 BRPM | DIASTOLIC BLOOD PRESSURE: 72 MMHG | OXYGEN SATURATION: 98 % | SYSTOLIC BLOOD PRESSURE: 113 MMHG | HEART RATE: 80 BPM | TEMPERATURE: 97.9 F

## 2021-09-09 NOTE — PROGRESS NOTES
Name: Susan Annamarie: [facility]  [facility]   Tanya mcwilliams  Date: 9/7/2021     Subjective:     Chief Complaint   Patient presents with    Follow-up       HPI  Bob Stuart is a 80 y.o. female being seen today for evaluation of acute rehab progress, open abdominal wound with colostomy, generalized pain, weakness, and COPD. Resident is alert and oriented x3, elderly, frail, in no acute distress. She is participating in PT and OT, not making progress. PT and OT have been decreased to 3 times a week and speech therapy continues 5 days a week. She requires minimal assistance for [facility] transfers, standby assistance to minimal assistance for bed mobility and to go from a lying position to sitting, sit to supine requires moderate assistance. No toileting exercises have been performed due to patient having a Marshall and a colostomy. Resident is discharging home and 24-hour care. Her two sons have been trained on how to transfer with the [facility] and have been trained in assisting with bed mobility. Resident will need a semielectric hospital bed upon discharge due to COPD. The head of the bed needs to be elevated more than 30 degrees most of the time and she will require positioning of the body in ways not feasible with an ordinary bed. Resident is definitely hard of hearing, communicates with communication board. She complains of small amount of generalized discomfort, is unable to quantify it on a scale of 1-10. Resident states pain is exacerbated with activity and subsides with rest and pain medication. She continues on routine Tylenol with good effect. Resident has a wound VAC in place at 125 mmHg to her abdominal incision, small amount of serosanguineous drainage. Abdomen is soft round positive bowel sounds x4 quadrants, colostomy is draining soft brown stool.   She is wearing oxygen at 2 L per nasal cannula, lung sounds reveal soft rhonchi and expiratory wheezing. Resident denies complaints of chest pain chest palpitations irregular heartbeat lightheadedness dizziness nausea vomiting diarrhea constipation. Has a Marshall catheter draining clear yellow urine, no hematuria. Nursing staff report labs, mild anemia, BMP normal.  Vital signs stable, no fever. PMH: Hypertension hyperlipidemia borderline diabetes anxiety osteoarthritis GERD history of TIA history of hyperactive thyroid history of vertigo atrial fibrillation COPD small bowel obstruction status post colostomy      Allergies:  Reviewed in nursing facility records  Medications:  Reviewed and reconciled in nursing facility records    Review of Systems Pertinent positives as noted in HPI. Objective:   /72   Pulse 80   Temp 97.9 °F (36.6 °C)   Resp 18   SpO2 98%     Physical Exam Constitutional: In bed thin elderly frail, in no acute distess  Pulmonary/Chest:  breathing unlabored, chest excursion symmetrical, no use of accessory muscles, lung sounds rhonchi wheezing, no shortness of breath at rest, dyspnea with activity  Cardiovascular:  S1-S2 regular, 2+ DP x 2, no edema  Abd/GI:  abdomen soft, round, non-distended, non-tender, no masses, active bowel sounds x 4 quadrants, colostomy with brown stool  : no SP tenderness,no CVA tenderness, no hematuria  MS/Extremities:  PAT, ROM limited, abnormal gait, no clubbing, no cyanosis  Psych:  A/O x 3, cooperative with care, no anxiety, appropriate mood and affect  Skin:  warm and dry, color normal, no lesions, no rashes, open abdominal wound with wound VAC     Diagnosis Orders   1. SBO (small bowel obstruction) ([address]     2. Status post colostomy ([address]     3. Generalized pain     4. Muscle weakness (generalized)     5. Chronic obstructive pulmonary disease, unspecified COPD type ([address]         Assessment and Plan:      1.  SBO (small bowel obstruction) (HCC)/Status post colostomy ([address]  Continue wound VAC at 125 mmHg, change every Monday Wednesday and Friday. Educate family on colostomy care prior to discharge. 2. Generalized pain  Continue routine Tylenol as ordered for pain    3. Muscle weakness (generalized)  Resident has been refusing to get out of bed, has persistent weakness, will need PT OT speech therapy and home health care at discharge. Discharge home with home health care, PT and OT ST, skilled nursing. Continue with PT and OT and ST while in facility as resident tolerates. 4. Chronic obstructive pulmonary disease, unspecified COPD type ([facility]  Continue continue albuterol treatments and oxygen at 2 L per nasal cannula, continue respiratory therapy consult. [facility]-please make arrangements with DME    Reviewed labs:  Yes    CBC  Lab Results   Component Value Date    WBC 8.3 09/07/2021    RBC 3.37 09/07/2021    HGB 9.8 09/07/2021    HCT 29.3 09/07/2021    MCV 87.1 09/07/2021    MCH 29.0 09/07/2021    MCHC 33.3 09/07/2021    RDW 15.4 09/07/2021     09/07/2021      BMP  Lab Results   Component Value Date     09/07/2021    K 4.4 09/07/2021     09/07/2021    CO2 22 09/07/2021    BUN 12 09/07/2021    CREATININE 0.48 09/07/2021    GLUCOSE 84 09/07/2021    CALCIUM 8.8 09/07/2021        I have reviewed the patient's medical history in detail and updated the computerized patient record. This note was partially generated using Dragon voice recognition system, and there may be some incorrect words, spellings, punctuation that were not noticed in checking the note before saving.     Rex Loza, APRN-CNP

## 2021-09-14 ENCOUNTER — OFFICE VISIT (OUTPATIENT)
Dept: GERIATRIC MEDICINE | Age: 86
End: 2021-09-14
Payer: COMMERCIAL

## 2021-09-14 VITALS
OXYGEN SATURATION: 98 % | RESPIRATION RATE: 18 BRPM | HEART RATE: 60 BPM | TEMPERATURE: 97.9 F | DIASTOLIC BLOOD PRESSURE: 94 MMHG | SYSTOLIC BLOOD PRESSURE: 129 MMHG

## 2021-09-14 DIAGNOSIS — R52 GENERALIZED PAIN: ICD-10-CM

## 2021-09-14 DIAGNOSIS — K56.609 SBO (SMALL BOWEL OBSTRUCTION) (HCC): Primary | ICD-10-CM

## 2021-09-14 DIAGNOSIS — Z93.3 STATUS POST COLOSTOMY (HCC): ICD-10-CM

## 2021-09-14 DIAGNOSIS — M62.81 MUSCLE WEAKNESS (GENERALIZED): ICD-10-CM

## 2021-09-14 DIAGNOSIS — J44.9 CHRONIC OBSTRUCTIVE PULMONARY DISEASE, UNSPECIFIED COPD TYPE (HCC): ICD-10-CM

## 2021-09-14 PROCEDURE — 99309 SBSQ NF CARE MODERATE MDM 30: CPT | Performed by: NURSE PRACTITIONER

## 2021-09-14 NOTE — PROGRESS NOTES
Name: Gigi Sees: [facility] [address]   Tanya mcwilliams  Date: 9/14/2021     Subjective:     Chief Complaint   Patient presents with    Follow-up       HPI  Kan Suazo is a 80 y.o. female being seen today for small bowel obstruction status post colostomy, generalized pain, muscle weakness, COPD. Resident is lying in bed, elderly, frail, in no acute distress. She has a colostomy due to small bowel obstruction, has been participating in PT and OT due to generalized weakness, she is nonambulatory. Resident states Kim Banuelos is going to be discharging home this weekend. \"  She is going home with her 2 sons daughter-in-law. She has a colostomy which her signs have been instructed on how to provide colostomy care. She has been participating in PT and OT, not making much progress due to refusing PT services and refusing to get out of bed. Physical therapy staff report that she is able to transfer with min assist with slide board, bed mobility is standby to minimal assistance, family will have to purchase a wheelchair and [employer]. She is using a bedpan for bowel movements, has a Marshall catheter. Is alert and oriented x3, poor appetite. She is ready to discharge home this weekend, family members have been instructed on colostomy care and wound care, will discharge with home health care PT OT skilled nursing. Colostomy is draining soft brown stool. Wound VAC is intact at 125 mmHg. She denies complaints of chest pain chest palpitation irregular heartbeat lightheadedness dizziness nausea vomiting diarrhea constipation. She does complain of generalized discomfort, pain level is 3 out of 10 on a scale of 10. She is on routine Tylenol which we will continue. She states \"I will not be able to do the care on my colostomy. Hopefully my son can do it. \"  Vital signs stable no fever. We will continue to monitor.     PMH: Hypertension hyperlipidemia borderline diabetes anxiety osteoarthritis GERD history of TIA history of hyperactive thyroid history of vertigo atrial fibrillation COPD small bowel obstruction status post colostomy      Allergies:  Reviewed in nursing facility records  Medications:  Reviewed and reconciled in nursing facility records    Review of Systems Pertinent positives as noted in HPI. Objective:   BP (!) 129/94   Pulse 60   Temp 97.9 °F (36.6 °C)   Resp 18   SpO2 98%     Physical Exam Constitutional: Lying in bed elderly, thin, frail, in no acute distess  Pulmonary/Chest:  breathing unlabored, chest excursion symmetrical, no use of accessory muscles, lung sounds clear and diminished in lower lobes, no shortness of breath at rest, dyspnea with activity  Cardiovascular:  S1-S2 regular, no murmur, 2+ DP x 2, no edema  Abd/GI:  abdomen soft, round, non-distended, non-tender, no masses, active bowel sounds x 4 quadrants  : no SP tenderness,no CVA tenderness, no hematuria, clear yellow urine with some sediment in Marshall catheter  MS/Extremities:  PAT, ROM limited, nonambulatory, no clubbing, no cyanosis  Psych:  A/O x 3, cooperative with care, no anxiety, appropriate mood and affect  Skin:  warm and dry, color normal, no lesions, no rashes     Diagnosis Orders   1. SBO (small bowel obstruction) ([address]     2. Status post colostomy ([address]     3. Generalized pain     4. Muscle weakness (generalized)     5. Chronic obstructive pulmonary disease, unspecified COPD type ([address]         Assessment and Plan:      1. SBO (small bowel obstruction) (HCC)/Status post colostomy ([address]  Continue wound VAC at 125 mmHg, change every Monday Wednesday Friday. Family instructed on colostomy care. 2. Generalized pain  Continue routine Tylenol. 3. Muscle weakness (generalized)  Continue PT and OT as ordered. Discharge home with home health care PT OT and skilled nursing.     4. Chronic obstructive pulmonary disease, unspecified COPD type ([address]  O2 worn at at bedtime, O2 saturation greater than 95% during the day. COPD symptoms at baseline. Continue aerosols and inhalers as ordered. Reviewed labs:  Yes    CBC  Lab Results   Component Value Date    WBC 8.3 09/07/2021    RBC 3.37 09/07/2021    HGB 9.8 09/07/2021    HCT 29.3 09/07/2021    MCV 87.1 09/07/2021    MCH 29.0 09/07/2021    MCHC 33.3 09/07/2021    RDW 15.4 09/07/2021     09/07/2021      BMP  Lab Results   Component Value Date     09/07/2021    K 4.4 09/07/2021     09/07/2021    CO2 22 09/07/2021    BUN 12 09/07/2021    CREATININE 0.48 09/07/2021    GLUCOSE 84 09/07/2021    CALCIUM 8.8 09/07/2021        Time based visit:  time spent 25 minutes>50% spent with counseling and coordination of care. Resident is very hard of hearing therefore need to communicate with communication board. Reviewed with the patient: current clinicalstatus, medications, activities and diet. Side effects, adverse effects of the medication prescribed, as well as treatment plan and result expectations. Discussed diagnostic results, other suggested diagnostic testing, prognosis, risk and benefits of treatment options, importance of compliance with treatment options with resident and nursing staff. Reviewed therapy plan and progress with therapists. Reviewed medical record, labs, medications, etc.    I have reviewed the patient's medical history in detail and updated the computerized patient record. This note was partially generated using Dragon voice recognition system, and there may be some incorrect words, spellings, punctuation that were not noticed in checking the note before saving.     Justine Donovan, APRN-CNP

## 2021-09-17 ENCOUNTER — OFFICE VISIT (OUTPATIENT)
Dept: GERIATRIC MEDICINE | Age: 86
End: 2021-09-17
Payer: MEDICARE

## 2021-09-17 DIAGNOSIS — M62.81 MUSCLE WEAKNESS (GENERALIZED): ICD-10-CM

## 2021-09-17 DIAGNOSIS — R52 GENERALIZED PAIN: ICD-10-CM

## 2021-09-17 DIAGNOSIS — K56.609 SBO (SMALL BOWEL OBSTRUCTION) (HCC): Primary | ICD-10-CM

## 2021-09-17 DIAGNOSIS — J44.9 CHRONIC OBSTRUCTIVE PULMONARY DISEASE, UNSPECIFIED COPD TYPE (HCC): ICD-10-CM

## 2021-09-17 DIAGNOSIS — Z93.3 STATUS POST COLOSTOMY (HCC): ICD-10-CM

## 2021-09-17 PROCEDURE — 99309 SBSQ NF CARE MODERATE MDM 30: CPT | Performed by: NURSE PRACTITIONER

## 2021-09-17 ASSESSMENT — ENCOUNTER SYMPTOMS
BACK PAIN: 1
CHEST TIGHTNESS: 0
ABDOMINAL DISTENTION: 0
SHORTNESS OF BREATH: 0
ABDOMINAL PAIN: 0

## 2021-09-17 NOTE — PROGRESS NOTES
Patient Name: Yulia Foster  Date: 8/5/2021  YOB: 1927  Medical Record Number: [id number]      History of Present Illness: This 51-year-old woman was seen in her room today for admission is physical.  Patient has somewhat complicated recent history. Has been homebound in the past receiving home visits has increased abdominal discomfort patient was initially felt to have possible urine tract infection subsequent sent to ER patient had evidence of severe constipation evidence of bowel obstruction patient was seen by surgical service initially had undergone conservative management also required bowel resection patient had evidence of adhesional would require adhesional lysis patient also required colostomy patient while hospitalized had a reproduction of the intubation if she however did have evidence of small pneumothoraces patient had a chest tube placed with decompression was stabilized patient did have evidence of cardiac event including atrial relation with RVR which was stabilized with diltiazem drip patient's heart rate improved she is globally frail patient was simply transferred to the [facility] for course of care where she sees local skin care respiratory improvement patient did make gains patient has been found evidence of prior DVT in the upper extremity patient has been globally stabilized patient has completed course antibiotic therapy had evidence of encephalopathy with functional decline transferred here for ongoing course of care. Patient seen in room globally confused weak at her baseline patient has not any recent emesis. Oral intake has been poor patient has been maintaining her oxygen saturation at this time notes no further RVR. Pain is improved but functionally quite weak. Review of Systems   Constitutional: Positive for activity change, appetite change and fatigue. HENT: Negative for congestion. Respiratory: Negative for chest tightness and shortness of breath. Cardiovascular: Negative for chest pain and leg swelling. Gastrointestinal: Negative for abdominal distention and abdominal pain. Genitourinary: Negative for dysuria. Musculoskeletal: Positive for back pain. Skin: Negative for rash. Neurological: Positive for weakness. Psychiatric/Behavioral: Positive for confusion. Review of Systems: All 14 review of systems negative other than as stated above    Social History     Tobacco Use    Smoking status: Not on file   Substance Use Topics    Alcohol use: Not on file    Drug use: Not on file         No past medical history on file. No past surgical history on file. Current Outpatient Medications on File Prior to Visit   Medication Sig Dispense Refill    aspirin 81 MG chewable tablet Take 81 mg by mouth daily      bimatoprost (LUMIGAN) 0.03 % ophthalmic drops Place 1 drop into both eyes nightly      brimonidine-timolol (COMBIGAN) 0.2-0.5 % ophthalmic solution Place 1 drop into both eyes nightly      enoxaparin (LOVENOX) 60 MG/0.6ML injection Inject 60 mg into the skin 2 times daily      furosemide (LASIX) 20 MG tablet Take 20 mg by mouth daily      levalbuterol (XOPENEX) 0.63 MG/3ML nebulization Take 1 ampule by nebulization 2 times daily      megestrol (MEGACE) 40 MG tablet Take 40 mg by mouth daily      midodrine (PROAMATINE) 5 MG tablet Take 5 mg by mouth 3 times daily (with meals)      pantoprazole (PROTONIX) 40 MG tablet Take 40 mg by mouth daily      potassium chloride (KLOR-CON M) 20 MEQ extended release tablet Take 20 mEq by mouth 2 times daily       No current facility-administered medications on file prior to visit. Allergies   Allergen Reactions    Keflex [Cephalexin]     Macrobid [Nitrofurantoin]     Penicillins     Sulfa Antibiotics          No family history on file. Physical Exam:      Physical Exam  Vitals and nursing note reviewed. Constitutional:       Appearance: She is not toxic-appearing. HENT:      Head: Atraumatic. Eyes:      Extraocular Movements: Extraocular movements intact. Cardiovascular:      Rate and Rhythm: Normal rate and regular rhythm. Abdominal:      General: Bowel sounds are normal.      Palpations: Abdomen is soft. Comments: Colostomy   Musculoskeletal:      Cervical back: Neck supple. Skin:     Findings: No rash. Neurological:      Mental Status: She is alert. She is disoriented. Psychiatric:         Mood and Affect: Mood normal.         There were no vitals taken for this visit. .   Lab Results   Component Value Date    WBC 8.3 09/07/2021    HGB 9.8 (L) 09/07/2021    HCT 29.3 (L) 09/07/2021    MCV 87.1 09/07/2021     09/07/2021     Lab Results   Component Value Date     09/07/2021    K 4.4 09/07/2021     09/07/2021    CO2 22 09/07/2021    BUN 12 09/07/2021    CREATININE 0.48 09/07/2021    GLUCOSE 84 09/07/2021    CALCIUM 8.8 09/07/2021                ASSESSMENT:  Patient Active Problem List   Diagnosis    [facility] obstruction) [facility]    Status post colostomy ([address]    Essential hypertension    Muscle weakness (generalized)    Unable to walk    Primary osteoarthritis    Chronic obstructive pulmonary disease (HCC)    Acute cystitis without hematuria    Generalized pain    Stage II pressure ulcer of left buttock ([facility])    Loss of appetite    Atrial fibrillation ([address]         PLAN:   Diagnosis Orders   1. Weakness     2. Chronic atrial fibrillation (HCC)     3. Osteoarthritis of multiple joints, unspecified osteoarthritis type     4. Abdominal pain, unspecified abdominal location           He will colostomy care encourage nutritional intake. Will monitor for skin breakdown. Courseof physical therapy outpatient focus on balance training as able. Repeat blood work is pending physical therapy outpatient therapy. Nutrition support. Monitoring for RVR.   Patient has been anticoagulated DVT of extremity in the past.  Monitoring for further abdominal discomfort repeat blood work is pending at this time patient will be following up with specialty services.

## 2021-09-26 NOTE — PROGRESS NOTES
Name: Jorge Alberto Ruiz: [facility]   Tanya [email or web address]  Date: 9/17/2021     Subjective:     Chief Complaint   Patient presents with    Follow-up       HPI  Sean García is a 80 y.o. female being seen today for small bowel obstruction status post colostomy, generalized pain, muscle weakness, COPD. Resident is scheduled to discharge home on Monday with her family. Resident is lying in bed, elderly, frail, in no acute distress. She has a colostomy due to small bowel obstruction, has been participating in PT and OT due to generalized weakness, slowly making progress, is nonambulatory, refuses PT and OT at times. She has a colostomy which her sons and daughter-in-law have been instructed on how to provide colostomy care. Physical therapy staff report that she is able to transfer with min assist with slide board, bed mobility is standby to minimal assistance, family will have to purchase a wheelchair and [employer]. She has a Marshall catheter, draining yellow urine. Is alert and oriented x3, poor appetite. Colostomy is draining soft brown stool. Wound VAC is intact at 125 mmHg. She denies complaints of chest pain chest palpitation irregular heartbeat lightheadedness dizziness nausea vomiting diarrhea constipation. She does complain of generalized discomfort, pain level is 3 out of 10 on a scale of 10. She is on routine Tylenol which we will continue. She states \"I will not be able to do the care on my colostomy. Hopefully my son can do it. \"  Vital signs stable no fever. We will continue to monitor.     PMH: Hypertension hyperlipidemia borderline diabetes anxiety osteoarthritis GERD history of TIA history of hyperactive thyroid history of vertigo atrial fibrillation COPD small bowel obstruction status post colostomy      Allergies:  Reviewed in nursing facility records  Medications:  Reviewed and reconciled in nursing facility records    Review of Systems Pertinent positives as noted in HPI. Objective:   See nursing facility record for vital signs    Physical Exam Constitutional: Lying in bed elderly, thin, frail, in no acute distess  Pulmonary/Chest:  breathing unlabored, chest excursion symmetrical, no use of accessory muscles, lung sounds clear and diminished in lower lobes soft rhonchi, no shortness of breath at rest, dyspnea with activity  Cardiovascular:  S1-S2 regular, no murmur, 2+ DP x 2, no edema  Abd/GI:  abdomen soft, round, non-distended, non-tender, no masses, active bowel sounds x 4 quadrants  : no SP tenderness,no CVA tenderness, no hematuria, clear yellow urine with some sediment in Marshall catheter  MS/Extremities:  PAT, ROM limited, nonambulatory, no clubbing, no cyanosis  Psych:  A/O x 3, cooperative with care, no anxiety, appropriate mood and affect  Skin:  warm and dry, color normal, no lesions, no rashes     Diagnosis Orders   1. SBO (small bowel obstruction) ([address]     2. Status post colostomy ([address]     3. Generalized pain     4. Muscle weakness (generalized)     5. Chronic obstructive pulmonary disease, unspecified COPD type ([address]         Assessment and Plan:      1. SBO (small bowel obstruction) (HCC)/Status post colostomy ([address]  Continue wound VAC at 125 mmHg, change every Monday Wednesday Friday. Family instructed on colostomy care. Discharged with home health care, PT/OT, skilled nursing. 2. Generalized pain  Continue routine Tylenol. 3. Muscle weakness (generalized)  Continue PT and OT as ordered. 4. Chronic obstructive pulmonary disease, unspecified COPD type ([address]  O2 worn at at bedtime, O2 saturation greater than 95% during the day. COPD symptoms at baseline. Continue aerosols and inhalers as ordered.       Reviewed labs:  Yes    CBC  Lab Results   Component Value Date    WBC 8.3 09/07/2021    RBC 3.37 09/07/2021    HGB 9.8 09/07/2021    HCT 29.3 09/07/2021    MCV 87.1 09/07/2021    MCH 29.0 09/07/2021    MCHC 33.3